# Patient Record
(demographics unavailable — no encounter records)

---

## 2025-03-18 NOTE — HISTORY OF PRESENT ILLNESS
[FreeTextEntry1] : 14 year well check 9th grade shay Interim: Will be moving to hospitals Stays active with: Basketball & football  Medications: Methylphenidate 18mg Fleoxetine 20mg PTSD ADHD:  Talk to therapist weekly Overweight: eliu Reyes, and  Will do blood work next visit and give HPV  Failed vision screen: Does need glasses

## 2025-03-18 NOTE — HISTORY OF PRESENT ILLNESS
[FreeTextEntry1] : 14 year well check 9th grade shay Interim: Will be moving to [facility] Stays active with: Basketball & football  Medications: Methylphenidate 18mg Fleoxetine 20mg PTSD ADHD:  Talk to therapist weekly Overweight: eliu Reyes, and  Will do blood work next visit and give HPV  Failed vision screen: Does need glasses

## 2025-03-26 NOTE — DISCUSSION/SUMMARY
[FreeTextEntry1] : 14 year well check 9th grade shay Interim: Will be moving to [facility] Stays active with: Basketball & football PMH: PTSD &ADHD: Talkw to therapist weekly Medications: Methylphenidate 18mg Fleoxetine 20mg  Elevated BMI: Discussed healthy eating habits and the importance of physical activity  Will do blood work next visit and give HPV Failed vision screen: Does need glasses, ophthalmology referral

## 2025-03-26 NOTE — DISCUSSION/SUMMARY
[FreeTextEntry1] : 14 year well check 9th grade shay Interim: Will be moving to \[facility]\"" Stays active with: Basketball & football PMH: PTSD &ADHD: Talkw to therapist weekly Medications: Methylphenidate 18mg Fleoxetine 20mg  Elevated BMI: Discussed healthy eating habits and the importance of physical activity  Will do blood work next visit and give HPV Failed vision screen: Does need glasses, ophthalmology referral